# Patient Record
Sex: FEMALE | Race: WHITE | URBAN - METROPOLITAN AREA
[De-identification: names, ages, dates, MRNs, and addresses within clinical notes are randomized per-mention and may not be internally consistent; named-entity substitution may affect disease eponyms.]

---

## 2018-02-20 ENCOUNTER — HOSPITAL ENCOUNTER (OUTPATIENT)
Dept: MAMMOGRAPHY | Age: 73
Discharge: HOME | End: 2018-02-20
Attending: FAMILY MEDICINE
Payer: MEDICARE

## 2018-02-20 DIAGNOSIS — R92.8 ABNORMAL MAMMOGRAM: ICD-10-CM

## 2018-02-20 DIAGNOSIS — R92.2 DENSE BREASTS: ICD-10-CM

## 2018-02-20 PROCEDURE — 76642 ULTRASOUND BREAST LIMITED: CPT

## 2018-02-20 PROCEDURE — 77066 DX MAMMO INCL CAD BI: CPT

## 2018-02-27 ENCOUNTER — HOSPITAL ENCOUNTER (OUTPATIENT)
Dept: MAMMOGRAPHY | Age: 73
Discharge: HOME | End: 2018-02-27
Attending: FAMILY MEDICINE
Payer: MEDICARE

## 2018-02-27 VITALS — TEMPERATURE: 98.8 F | HEART RATE: 88 BPM | DIASTOLIC BLOOD PRESSURE: 73 MMHG | SYSTOLIC BLOOD PRESSURE: 155 MMHG

## 2018-02-27 DIAGNOSIS — N63.10 MASS OF RIGHT BREAST: ICD-10-CM

## 2018-02-27 DIAGNOSIS — N63.10 BREAST MASS, RIGHT: ICD-10-CM

## 2018-02-27 PROCEDURE — 88305 TISSUE EXAM BY PATHOLOGIST: CPT | Performed by: FAMILY MEDICINE

## 2018-02-27 PROCEDURE — 74011000250 HC RX REV CODE- 250: Performed by: FAMILY MEDICINE

## 2018-02-27 PROCEDURE — 77065 DX MAMMO INCL CAD UNI: CPT

## 2018-02-27 PROCEDURE — A4648 IMPLANTABLE TISSUE MARKER: HCPCS

## 2018-02-27 RX ORDER — LIDOCAINE HYDROCHLORIDE 10 MG/ML
5 INJECTION INFILTRATION; PERINEURAL
Status: COMPLETED | OUTPATIENT
Start: 2018-02-27 | End: 2018-02-27

## 2018-02-27 RX ADMIN — LIDOCAINE HYDROCHLORIDE 5 ML: 10 INJECTION, SOLUTION INFILTRATION; PERINEURAL at 09:25

## 2021-08-04 ENCOUNTER — TRANSCRIBE ORDER (OUTPATIENT)
Dept: SCHEDULING | Age: 76
End: 2021-08-04

## 2021-08-04 DIAGNOSIS — Z78.0 POSTMENOPAUSAL ESTROGEN DEFICIENCY: Primary | ICD-10-CM

## 2024-02-02 ENCOUNTER — HOSPITAL ENCOUNTER (EMERGENCY)
Age: 79
Discharge: HOME | End: 2024-02-02
Attending: STUDENT IN AN ORGANIZED HEALTH CARE EDUCATION/TRAINING PROGRAM
Payer: MEDICARE

## 2024-02-02 VITALS
WEIGHT: 174 LBS | HEART RATE: 63 BPM | TEMPERATURE: 98 F | SYSTOLIC BLOOD PRESSURE: 118 MMHG | OXYGEN SATURATION: 99 % | HEIGHT: 67 IN | DIASTOLIC BLOOD PRESSURE: 70 MMHG | BODY MASS INDEX: 27.31 KG/M2 | RESPIRATION RATE: 18 BRPM

## 2024-02-02 DIAGNOSIS — R20.0 NUMBNESS AND TINGLING: Primary | ICD-10-CM

## 2024-02-02 DIAGNOSIS — R20.2 NUMBNESS AND TINGLING: Primary | ICD-10-CM

## 2024-02-02 LAB
ALBUMIN SERPL-MCNC: 3.7 G/DL (ref 3.2–4.6)
ALBUMIN/GLOB SERPL: 1.5 (ref 0.4–1.6)
ALP SERPL-CCNC: 54 U/L (ref 45–117)
ALT SERPL-CCNC: 56 U/L (ref 13–61)
ANION GAP SERPL CALC-SCNC: 10 MMOL/L (ref 2–11)
AST SERPL-CCNC: 32 U/L (ref 15–37)
BASOPHILS # BLD: 0 K/UL (ref 0–0.2)
BASOPHILS NFR BLD: 0 % (ref 0–2)
BILIRUB SERPL-MCNC: 0.3 MG/DL (ref 0.2–1.1)
BUN SERPL-MCNC: 34 MG/DL (ref 8–23)
CALCIUM SERPL-MCNC: 7.4 MG/DL (ref 8.3–10.4)
CHLORIDE SERPL-SCNC: 105 MMOL/L (ref 98–107)
CO2 SERPL-SCNC: 23 MMOL/L (ref 21–32)
CREAT SERPL-MCNC: 1.36 MG/DL (ref 0.6–1)
DIFFERENTIAL METHOD BLD: ABNORMAL
EOSINOPHIL # BLD: 0 K/UL (ref 0–0.8)
EOSINOPHIL NFR BLD: 0 % (ref 0.5–7.8)
ERYTHROCYTE [DISTWIDTH] IN BLOOD BY AUTOMATED COUNT: 14.9 % (ref 11.9–14.6)
GLOBULIN SER CALC-MCNC: 2.5 G/DL (ref 2.8–4.5)
GLUCOSE BLD STRIP.AUTO-MCNC: 294 MG/DL (ref 65–100)
GLUCOSE SERPL-MCNC: 303 MG/DL (ref 65–100)
HCT VFR BLD AUTO: 32.9 % (ref 35.8–46.3)
HGB BLD-MCNC: 10.3 G/DL (ref 11.7–15.4)
IMM GRANULOCYTES # BLD AUTO: 0.4 K/UL (ref 0–0.5)
IMM GRANULOCYTES NFR BLD AUTO: 4 % (ref 0–5)
LYMPHOCYTES # BLD: 0.6 K/UL (ref 0.5–4.6)
LYMPHOCYTES NFR BLD: 5 % (ref 13–44)
MCH RBC QN AUTO: 30.1 PG (ref 26.1–32.9)
MCHC RBC AUTO-ENTMCNC: 31.3 G/DL (ref 31.4–35)
MCV RBC AUTO: 96.2 FL (ref 82–102)
MONOCYTES # BLD: 0.3 K/UL (ref 0.1–1.3)
MONOCYTES NFR BLD: 3 % (ref 4–12)
NEUTS SEG # BLD: 9.5 K/UL (ref 1.7–8.2)
NEUTS SEG NFR BLD: 87 % (ref 43–78)
NRBC # BLD: 0 K/UL (ref 0–0.2)
PLATELET # BLD AUTO: 241 K/UL (ref 150–450)
PMV BLD AUTO: 10.8 FL (ref 9.4–12.3)
POTASSIUM SERPL-SCNC: 5.1 MMOL/L (ref 3.5–5.1)
PROT SERPL-MCNC: 6.2 G/DL (ref 6.4–8.2)
RBC # BLD AUTO: 3.42 M/UL (ref 4.05–5.2)
SERVICE CMNT-IMP: ABNORMAL
SODIUM SERPL-SCNC: 138 MMOL/L (ref 133–143)
TROPONIN T SERPL HS-MCNC: 29.9 NG/L (ref 0–14)
TROPONIN T SERPL HS-MCNC: 33 NG/L (ref 0–14)
WBC # BLD AUTO: 10.9 K/UL (ref 4.3–11.1)

## 2024-02-02 PROCEDURE — 85025 COMPLETE CBC W/AUTO DIFF WBC: CPT

## 2024-02-02 PROCEDURE — 82962 GLUCOSE BLOOD TEST: CPT

## 2024-02-02 PROCEDURE — 80053 COMPREHEN METABOLIC PANEL: CPT

## 2024-02-02 PROCEDURE — 84484 ASSAY OF TROPONIN QUANT: CPT

## 2024-02-02 PROCEDURE — 99284 EMERGENCY DEPT VISIT MOD MDM: CPT

## 2024-02-02 RX ORDER — OSELTAMIVIR PHOSPHATE 75 MG/1
75 CAPSULE ORAL 2 TIMES DAILY
COMMUNITY
Start: 2024-01-31 | End: 2024-02-05

## 2024-02-02 RX ORDER — ONDANSETRON 4 MG/1
4 TABLET, ORALLY DISINTEGRATING ORAL 3 TIMES DAILY PRN
COMMUNITY
Start: 2024-01-31 | End: 2024-02-05

## 2024-02-02 RX ORDER — ROPINIROLE 0.25 MG/1
0.25 TABLET, FILM COATED ORAL
COMMUNITY
Start: 2024-01-10 | End: 2024-04-09

## 2024-02-02 RX ORDER — HYDROXYZINE HYDROCHLORIDE 25 MG/1
25 TABLET, FILM COATED ORAL EVERY 8 HOURS PRN
COMMUNITY
Start: 2024-01-31 | End: 2024-02-07

## 2024-02-02 RX ORDER — WARFARIN SODIUM 2.5 MG/1
3.75-5 TABLET ORAL DAILY
COMMUNITY
Start: 2023-08-31

## 2024-02-02 RX ORDER — POTASSIUM CHLORIDE 20 MEQ/1
20 TABLET, EXTENDED RELEASE ORAL DAILY
COMMUNITY
Start: 2024-01-31 | End: 2024-02-04

## 2024-02-02 RX ORDER — NALOXONE HYDROCHLORIDE 4 MG/.1ML
4 SPRAY NASAL
COMMUNITY
Start: 2024-01-05

## 2024-02-02 RX ORDER — PREDNISONE 10 MG/1
40 TABLET ORAL DAILY
COMMUNITY
Start: 2024-02-01 | End: 2024-02-05

## 2024-02-02 RX ORDER — FUROSEMIDE 20 MG/1
60 TABLET ORAL DAILY
COMMUNITY
Start: 2024-01-31 | End: 2024-02-04

## 2024-02-02 RX ORDER — ATENOLOL 25 MG/1
25 TABLET ORAL DAILY
COMMUNITY
Start: 2023-08-17

## 2024-02-02 ASSESSMENT — PAIN - FUNCTIONAL ASSESSMENT: PAIN_FUNCTIONAL_ASSESSMENT: 0-10

## 2024-02-02 ASSESSMENT — LIFESTYLE VARIABLES
HOW MANY STANDARD DRINKS CONTAINING ALCOHOL DO YOU HAVE ON A TYPICAL DAY: PATIENT DOES NOT DRINK
HOW OFTEN DO YOU HAVE A DRINK CONTAINING ALCOHOL: NEVER

## 2024-02-02 ASSESSMENT — PAIN SCALES - GENERAL: PAINLEVEL_OUTOF10: 0

## 2024-02-03 LAB
EKG ATRIAL RATE: 0 BPM
EKG DIAGNOSIS: NORMAL
EKG Q-T INTERVAL: 451 MS
EKG QRS DURATION: 115 MS
EKG QTC CALCULATION (BAZETT): 458 MS
EKG R AXIS: 64 DEGREES
EKG T AXIS: -44 DEGREES
EKG VENTRICULAR RATE: 62 BPM

## 2024-02-03 NOTE — ED NOTES
I have reviewed discharge instructions with the patient.  The patient verbalized understanding.    Patient left ED via Discharge Method: wheelchair to Home with spouse.    Opportunity for questions and clarification provided.       Patient given 0 scripts.         To continue your aftercare when you leave the hospital, you may receive an automated call from our care team to check in on how you are doing.  This is a free service and part of our promise to provide the best care and service to meet your aftercare needs.” If you have questions, or wish to unsubscribe from this service please call [phone].  Thank you for Choosing our [facility] Emergency Department.

## 2024-02-03 NOTE — DISCHARGE INSTRUCTIONS
Continue to monitor your symptoms at home.  Return to the ER for any worsening symptoms.  Otherwise follow-up with primary care physician this coming week.

## 2024-02-03 NOTE — ED TRIAGE NOTES
Pt to ED via wheelchair with c/o oral numbness for the past few days. Pt tested positive for the flu Wednesday and was recently hospitalized for pneumonia. Pt states she has been taking Tamiflu x2 days and has felt \"foggy\" ever since.

## 2024-02-03 NOTE — ED PROVIDER NOTES
General: Abdomen is flat.      Palpations: Abdomen is soft.      Tenderness: There is no abdominal tenderness.   Musculoskeletal:         General: No swelling or tenderness. Normal range of motion.      Cervical back: Normal range of motion. No rigidity.   Skin:     General: Skin is warm.      Findings: No rash.   Neurological:      General: No focal deficit present.      Mental Status: She is alert and oriented to person, place, and time.      Cranial Nerves: No cranial nerve deficit.      Sensory: No sensory deficit.      Motor: No weakness.   Psychiatric:         Mood and Affect: Mood normal.          Procedures     Procedures    Orders Placed This Encounter   Procedures    CBC with Auto Differential    CMP    Troponin T    Troponin T    POCT Glucose        Medications given during this emergency department visit:  Medications - No data to display    New Prescriptions    No medications on file        No past medical history on file.     Past Surgical History:   Procedure Laterality Date    US BREAST BIOPSY W LOC DEVICE 1ST LESION RIGHT Right 2/27/2018    US BREAST NEEDLE BIOPSY RIGHT 2/27/2018 [facility] RADIOLOGY M[facility]        Social History     Socioeconomic History    Marital status:         Previous Medications    ATENOLOL (TENORMIN) 25 MG TABLET    Take 1 tablet by mouth daily    FUROSEMIDE (LASIX) 20 MG TABLET    Take 3 tablets by mouth daily    HYDROXYZINE HCL (ATARAX) 25 MG TABLET    Take 1 tablet by mouth every 8 hours as needed    NALOXONE 4 MG/0.1ML LIQD NASAL SPRAY    1 spray once as needed    ONDANSETRON (ZOFRAN-ODT) 4 MG DISINTEGRATING TABLET    Place 1 tablet under the tongue 3 times daily as needed    OSELTAMIVIR (TAMIFLU) 75 MG CAPSULE    Take 1 capsule by mouth 2 times daily    POTASSIUM CHLORIDE (KLOR-CON M) 20 MEQ EXTENDED RELEASE TABLET    Take 1 tablet by mouth daily    PREDNISONE (DELTASONE) 10 MG TABLET    Take 4 tablets by mouth daily    ROPINIROLE (REQUIP) 0.25 MG TABLET    Take 1 tablet

## 2024-03-28 ENCOUNTER — HOSPITAL ENCOUNTER (OUTPATIENT)
Dept: PHYSICAL THERAPY | Age: 79
Setting detail: RECURRING SERIES
Discharge: HOME | End: 2024-03-31
Payer: MEDICARE

## 2024-03-28 DIAGNOSIS — I97.2 POSTMASTECTOMY LYMPHEDEMA SYNDROME: Primary | ICD-10-CM

## 2024-03-28 PROCEDURE — 97535 SELF CARE MNGMENT TRAINING: CPT

## 2024-03-28 PROCEDURE — 97166 OT EVAL MOD COMPLEX 45 MIN: CPT

## 2024-03-28 ASSESSMENT — PAIN DESCRIPTION - PAIN TYPE: TYPE: OTHER (COMMENT)

## 2024-03-28 ASSESSMENT — PAIN SCALES - GENERAL: PAINLEVEL_OUTOF10: 0

## 2024-03-28 NOTE — PROGRESS NOTES
23.3           Elbow 42 30.6 24.6           Axilla 65 35.1 32.1              Treatment   SELF CARE: (25 minutes):   Procedure(s) (per grid) utilized to improve and/or restore self-care/home management as related to  home management of lymphedema . Required moderate visual and verbal cueing to facilitate  home management of lymphedema .  Patient educated re: anatomy and physiology of UE lymphatic system as well as complete decongestive therapy for the right UE to include skin care, manual lymph drainage, compression bandaging and garments and exercise. She verbalized and/or demonstrated understanding of all education related to home management of edema/lymphedema.      Patient's right UE wrapped from fingers to just distal to axilla with finger bandages, stockinette and padding then short-stretch compression bandages. Patient educated to wear until next visit unless the bandages slid down or became painful then remove; she verbalized understanding.    Treatment/Session Summary:    Treatment Assessment:   Patient agreeable to goals and plan of care.    Communication/Consultation:  Therapy Evaluation sent to referring provider  Equipment provided today:  compression bandaging supplies  Recommendations/Intent for next treatment session: Next visit will focus on complete decongestive therapy.    >Total Treatment Billable Duration: 25 minutes in addition to evaluation  OT Individual Minutes  Time In: 0835  Time Out: 0935  Minutes: 60    MASSIEL HUGGINS OT     Charge Capture  [employer]  Appt Desk    Future Appointments   Date Time Provider Department Center   4/4/2024  2:30 PM Massiel Huggins OT [facility]   4/8/2024 11:00 AM Massiel Huggins OT [facility]   4/10/2024  9:30 AM Massiel Huggins OT [facility]   4/15/2024  8:45 AM Massiel Huggins OT [facility]   4/18/2024  8:45 AM Massiel Huggins OT [facility]

## 2024-03-28 NOTE — THERAPY EVALUATION
Zoey Lawson  : 1945  Primary: [employer] (Medicare Managed)  Secondary:  [facility] @ [address]  [facility] [address]  Phone: [phone]  Fax: [phone] Plan Frequency: 2x/week for 90 days    Certification Period Expiration Date: 24        Plan of Care/Certification Expiration Date:  Certification Period Expiration Date: 24      Frequency/Duration: Plan Frequency: 2x/week for 90 days      Time In/Out:   Time In: 0835  Time Out: 0935    OT Visit Info:  Plan Frequency: 2x/week for 90 days  Progress Note Due Date: 24  Total # of Visits to Date: 1  OT Insurance Information: $25 copay  Progress Note Counter: 1       Visit Count: Visit count could not be calculated. Make sure you are using a visit which is associated with an episode.   OUTPATIENT OCCUPATIONAL THERAPY:Initial Assessment 3/28/2024  Episode: (Lymphedema)           Treatment Diagnosis:    Postmastectomy lymphedema syndrome  Medical/Referring Diagnosis:    Lymphedema, not elsewhere classified   Referring Physician:  Jaden Alvarado MD MD Orders:  OT Eval and Treat   Return MD Appt:  2024  Date of Onset:  Onset Date: 24     Allergies:  Patient has no known allergies.  Restrictions/Precautions:    Lymphedema precautions      Medications Last Reviewed:  3/28/2024     SUBJECTIVE   History of Injury/Illness (Reason for Referral):  Patient presents with a history of bilateral breast cancer; she has had chemotherapy and then a modified radical mastectomy in January with axillary node removal with positive nodes for metastasis noted. She reports she began noticing swelling in her right UE about 3 weeks after surgery.   Patient Stated Goal(s):  \"to help the swelling\"  Initial Pain Assessment:     0/10    Post Session Pain Level:     0/10  Past Medical History/Comorbidities:   Ms. Lawson  has no past medical history on file.  Ms. Lawson  has a past

## 2024-04-02 ENCOUNTER — HOSPITAL ENCOUNTER (OUTPATIENT)
Dept: PHYSICAL THERAPY | Age: 79
Setting detail: RECURRING SERIES
Discharge: HOME | End: 2024-04-05
Payer: MEDICARE

## 2024-04-02 NOTE — PROGRESS NOTES
Zoey Lawson  : 1945  Primary: [employer]  Secondary:  [facility] @ [address]  [facility] [address]  Phone: [phone]  Fax: [phone]    PT Visit Info:    No data recorded   OT Visit Info:  Total # of Visits to Date: 1  OT Insurance Information: $25 copay  Progress Note Counter: 1  Progress Note Due Date: 24      OUTPATIENT THERAPY: 2024  Episode  Appt Desk        Zoey Lawson  arrived for  her appointment for today but left without being seen due to illness, complaining of needing to use her oxygen due to difficulty breathing. She left here to go to our [place] ED.  Will plan to follow up next during next appointment.  Thank you,  MASSIEL HUGGINS, OT    Future Appointments   Date Time Provider Department Center   2024  2:30 PM Massiel Huggins, OT [facility]   2024 11:00 AM Massiel Huggins, OT [facility]   4/10/2024  9:30 AM Massiel Huggins, OT [facility]   4/15/2024  8:45 AM Massiel Huggins, OT [facility]   2024  8:45 AM Massiel Huggins, OT [facility]

## 2024-04-04 ENCOUNTER — HOSPITAL ENCOUNTER (OUTPATIENT)
Dept: PHYSICAL THERAPY | Age: 79
Setting detail: RECURRING SERIES
Discharge: HOME | End: 2024-04-07
Payer: MEDICARE

## 2024-04-04 PROCEDURE — 97535 SELF CARE MNGMENT TRAINING: CPT

## 2024-04-04 ASSESSMENT — PAIN SCALES - GENERAL: PAINLEVEL_OUTOF10: 0

## 2024-04-04 NOTE — PROGRESS NOTES
Zoey Lawson  : 1945  Primary: [employer] (Medicare Managed)  Secondary:  [facility] @ [address]  [facility] [address]  Phone: [phone]  Fax: [phone]    Plan of Care/Certification Expiration Date:  Certification Period Expiration Date: 24      Frequency/Duration: Plan Frequency: 2x/week for 90 days      Time In/Out:   Time In: 1430  Time Out: 1455    OT Visit Info:  Plan Frequency: 2x/week for 90 days  Progress Note Due Date: 24  Total # of Visits to Date: 2  OT Insurance Information: $25 copay  Progress Note Counter: 2       Visit Count: Visit count could not be calculated. Make sure you are using a visit which is associated with an episode.   OUTPATIENT OCCUPATIONAL THERAPY: Treatment Note 2024  Episode: (Lymphedema)         Treatment Diagnosis:    Postmastectomy lymphedema syndrome  Medical/Referring Diagnosis:   Lymphedema, not elsewhere classified   Referring Physician:  aJden Alvarado MD MD Orders:  OT Eval and Treat   Return MD Appt:   2024    Date of Onset:  Onset Date: 24     Allergies:  Patient has no known allergies.  Restrictions/Precautions:   Lymphedema precautions      Medications Last Reviewed:  2024     Interventions Planned (Treatment may consist of any combination of the following):     See Assessment Note      Subjective Comments:   Patient reports she was in the hospital for a blood clot in her heart and that they changed some of her medicine and that they said it was fine to wrap her arm. She reports she does not want to go through any chemotherapy or radiation that will make her feel sick.     >Initial Pain Level:     0/10  >Post Session Pain Level:     0/10  Medications Last Reviewed:  2024  Updated Objective Findings:   See below:  Lymphedema:  Circumference Measurements (Upper Extremity):              Date:  3/28 Date:    Date: Date: Date:   CM from

## 2024-04-10 ENCOUNTER — HOSPITAL ENCOUNTER (OUTPATIENT)
Dept: PHYSICAL THERAPY | Age: 79
Setting detail: RECURRING SERIES
End: 2024-04-10
Payer: MEDICARE

## 2024-04-10 NOTE — PROGRESS NOTES
Zoey Lawson  : 1945  Primary: [employer]  Secondary:  [facility] @ [address]  [facility] [address]  Phone: [phone]  Fax: [phone]    PT Visit Info:    No data recorded   OT Visit Info:  Total # of Visits to Date: 2  OT Insurance Information: $25 copay  Progress Note Counter: 2  Progress Note Due Date: 24  Canceled Appointment: 2      OUTPATIENT THERAPY: 4/10/2024  Episode  Appt Desk        Zoey Lawson cancelled her appointment for today due to  choosing to hold on therapy for now .  She reports she will have her daughter wrap her arm for now. Will keep plan of care open in case patient needs or wants to return during that time period.   Thank you,  ESTELA HUGGINS, OT    No future appointments.

## 2024-04-15 ENCOUNTER — APPOINTMENT (OUTPATIENT)
Dept: PHYSICAL THERAPY | Age: 79
End: 2024-04-15
Payer: MEDICARE

## 2024-04-18 ENCOUNTER — APPOINTMENT (OUTPATIENT)
Dept: PHYSICAL THERAPY | Age: 79
End: 2024-04-18
Payer: MEDICARE

## 2024-09-23 PROBLEM — C79.51 CARCINOMA OF BREAST METASTATIC TO BONE (HCC): Status: ACTIVE | Noted: 2024-09-23

## 2024-09-23 PROBLEM — C50.919 CARCINOMA OF BREAST METASTATIC TO BONE (HCC): Status: ACTIVE | Noted: 2024-09-23

## 2025-02-18 ENCOUNTER — OFFICE VISIT (OUTPATIENT)
Dept: ONCOLOGY | Age: 80
End: 2025-02-18
Payer: MEDICARE

## 2025-02-18 VITALS
RESPIRATION RATE: 14 BRPM | OXYGEN SATURATION: 96 % | HEART RATE: 74 BPM | SYSTOLIC BLOOD PRESSURE: 139 MMHG | WEIGHT: 179.2 LBS | TEMPERATURE: 98.3 F | BODY MASS INDEX: 28.12 KG/M2 | HEIGHT: 67 IN | DIASTOLIC BLOOD PRESSURE: 71 MMHG

## 2025-02-18 DIAGNOSIS — C50.919 CARCINOMA OF BREAST METASTATIC TO BONE, UNSPECIFIED LATERALITY (HCC): Primary | ICD-10-CM

## 2025-02-18 DIAGNOSIS — C79.51 CARCINOMA OF BREAST METASTATIC TO BONE, UNSPECIFIED LATERALITY (HCC): Primary | ICD-10-CM

## 2025-02-18 PROCEDURE — G8400 PT W/DXA NO RESULTS DOC: HCPCS | Performed by: STUDENT IN AN ORGANIZED HEALTH CARE EDUCATION/TRAINING PROGRAM

## 2025-02-18 PROCEDURE — 1123F ACP DISCUSS/DSCN MKR DOCD: CPT | Performed by: STUDENT IN AN ORGANIZED HEALTH CARE EDUCATION/TRAINING PROGRAM

## 2025-02-18 PROCEDURE — G8428 CUR MEDS NOT DOCUMENT: HCPCS | Performed by: STUDENT IN AN ORGANIZED HEALTH CARE EDUCATION/TRAINING PROGRAM

## 2025-02-18 PROCEDURE — 1090F PRES/ABSN URINE INCON ASSESS: CPT | Performed by: STUDENT IN AN ORGANIZED HEALTH CARE EDUCATION/TRAINING PROGRAM

## 2025-02-18 PROCEDURE — 4004F PT TOBACCO SCREEN RCVD TLK: CPT | Performed by: STUDENT IN AN ORGANIZED HEALTH CARE EDUCATION/TRAINING PROGRAM

## 2025-02-18 PROCEDURE — 99205 OFFICE O/P NEW HI 60 MIN: CPT | Performed by: STUDENT IN AN ORGANIZED HEALTH CARE EDUCATION/TRAINING PROGRAM

## 2025-02-18 PROCEDURE — G8419 CALC BMI OUT NRM PARAM NOF/U: HCPCS | Performed by: STUDENT IN AN ORGANIZED HEALTH CARE EDUCATION/TRAINING PROGRAM

## 2025-02-18 PROCEDURE — 1125F AMNT PAIN NOTED PAIN PRSNT: CPT | Performed by: STUDENT IN AN ORGANIZED HEALTH CARE EDUCATION/TRAINING PROGRAM

## 2025-02-18 RX ORDER — MAGNESIUM OXIDE 400 MG/1
400 TABLET ORAL DAILY
COMMUNITY
Start: 2024-07-30 | End: 2025-07-25

## 2025-02-18 RX ORDER — HYDROCODONE BITARTRATE AND ACETAMINOPHEN 7.5; 325 MG/1; MG/1
TABLET ORAL
COMMUNITY
Start: 2025-01-07

## 2025-02-18 ASSESSMENT — PATIENT HEALTH QUESTIONNAIRE - PHQ9
1. LITTLE INTEREST OR PLEASURE IN DOING THINGS: NOT AT ALL
SUM OF ALL RESPONSES TO PHQ QUESTIONS 1-9: 0
SUM OF ALL RESPONSES TO PHQ QUESTIONS 1-9: 0
SUM OF ALL RESPONSES TO PHQ9 QUESTIONS 1 & 2: 0
SUM OF ALL RESPONSES TO PHQ QUESTIONS 1-9: 0
2. FEELING DOWN, DEPRESSED OR HOPELESS: NOT AT ALL
SUM OF ALL RESPONSES TO PHQ QUESTIONS 1-9: 0

## 2025-02-18 NOTE — PROGRESS NOTES
[facility]: Office Visit New Patient H & P    Chief Complaint:    Chief Complaint   Patient presents with    New Patient         History of Present Illness:  Ms. Lawson is a 79 y.o. female who was referred to me for evaluation of metastatic breast cancer.    History was taken with the help of patient and previous documentation.  Patient had longstanding but had a mass in her right breast since 2015.  In 2018 she underwent bilateral diagnostic mammo from irregular palpable mass.  Patient got initial biopsy but at the time did not further pursue further imaging/MRI is recommended and presented again in 2021.  When she underwent diagnostic bilateral diagnostic mammogram on 6/29/2021 which showed enlarging upper outer quadrant middle depth mass with calcification spiculation measuring 2.1 x 3.1 x 4.3 cm in right breast.  In the left breast, multiple calcifications in upper outer quadrant were present extending over 7 cm from middle depth axilla which were increasing and separate in 3 groups.  Biopsy was recommended.  On further imaging she was found to have metastatic disease and she was started on Faslodex and Ibrance.  She was also receiving Xgeva.  She has had recurrent hospital admissions from her CHF. She also has artifical mitral valve. Her Ibrance and zometa was held somewhere around 5/118/23. At appointment on 12/29/2023 patient had right breast mass ulcerating and draining and she was sent for right mastectomy which was done on 1/4/24. At this appointment as per documentation patient refused Faslodex and xgeva. Ibrance was never restarted.  On 1/29/24 - path results from mastectomy discussed with her and it was mentioned she is nor a candidate for chemotherapy at this time, may reconsider of her symptoms improve. She was last seen by oncology on 3/25/24, it was decided that she is not a candidate for chemo and her faslodex and ibrance was stopped. We talked at length about hospice but

## 2025-02-18 NOTE — PATIENT INSTRUCTIONS
Patient Information from Today's Visit    The members of your [facility] are listed below:    Physician Provider: Lydia Coppola MD  Medical Oncologist  Advanced Practice Clinician: KOBE Carreon  Registered Nurse: Ana Cristina BONILLA RN  Navigator: Rosalba GREY RN or Mary Lou ORNELAS RN  Medical Assistant: RAMBO Dumas  : Sarbjit  Supportive Care Services[place] [facility]    Diagnosis: Breast      Follow Up Instructions: 4 weeks.    History reviewed.  Symptoms reviewed.  PET scan recommended.  You can call to schedule this at [phone].      Treatment Summary has been discussed and given to patient:  N/A      Please refer to After Visit Summary or MyChart for upcoming appointment information. Please call our office for rescheduling needs at least 24 hours before your scheduled appointment time.If you have any questions regarding your upcoming schedule please reach out to your care team through [employer] or call [phone].     Please notify your assigned Nurse Navigator of any unplanned hospital admissions or Emergency Room visits within 24 hours of discharge.    -------------------------------------------------------------------------------------------------------------------  Please call our office at [phone] if you have any  of the following symptoms:   Fever of 100.5 or greater  Chills  Shortness of breath  Swelling or pain in one leg    After office hours an answering service is available and will contact a provider for emergencies or if you are experiencing any of the above symptoms.    JONH MORROW RN

## 2025-02-19 ENCOUNTER — CLINICAL DOCUMENTATION (OUTPATIENT)
Dept: ONCOLOGY | Age: 80
End: 2025-02-19

## 2025-02-19 NOTE — PROGRESS NOTES
Gianni met with pt (2/18) after MA notified SW that pt needed assistance with completing the financial assistance application.  Sw assisted pt with completing the remainder of the application with information provided by patient.  Sw reiterated to pt that she needs to return verification of income and her bank statement from the last 30 days.    Gianni submitted the financial assistance document to the Financial Counselor on 2/19.

## 2025-03-06 LAB
CARIS ANDROGEN RECEPTOR: POSITIVE
CARIS ER: POSITIVE
CARIS GENOMIC LOH - EXOME: NORMAL
CARIS HER2 CISH: NORMAL
CARIS HER2/NEU: NORMAL
CARIS HLA-A: NORMAL
CARIS HLA-B: NORMAL
CARIS HLA-C: NORMAL
CARIS MSI - EXOME: NORMAL
CARIS PD-L1 (22C3): POSITIVE
CARIS PR: POSITIVE
CARIS PTEN: NEGATIVE
CARIS TMB - EXOME: NORMAL

## 2025-03-11 ENCOUNTER — RESULTS FOLLOW-UP (OUTPATIENT)
Dept: ONCOLOGY | Age: 80
End: 2025-03-11

## 2025-03-18 DIAGNOSIS — C79.51 CARCINOMA OF BREAST METASTATIC TO BONE, UNSPECIFIED LATERALITY: Primary | ICD-10-CM

## 2025-03-18 DIAGNOSIS — C50.919 CARCINOMA OF BREAST METASTATIC TO BONE, UNSPECIFIED LATERALITY: Primary | ICD-10-CM
